# Patient Record
Sex: MALE | Race: WHITE
[De-identification: names, ages, dates, MRNs, and addresses within clinical notes are randomized per-mention and may not be internally consistent; named-entity substitution may affect disease eponyms.]

---

## 2017-03-19 ENCOUNTER — HOSPITAL ENCOUNTER (EMERGENCY)
Dept: HOSPITAL 75 - ER | Age: 72
Discharge: HOME | End: 2017-03-19
Payer: COMMERCIAL

## 2017-03-19 VITALS — HEIGHT: 71 IN | WEIGHT: 176 LBS | BODY MASS INDEX: 24.64 KG/M2

## 2017-03-19 VITALS — SYSTOLIC BLOOD PRESSURE: 137 MMHG | DIASTOLIC BLOOD PRESSURE: 81 MMHG

## 2017-03-19 DIAGNOSIS — M47.816: ICD-10-CM

## 2017-03-19 DIAGNOSIS — S49.91XA: Primary | ICD-10-CM
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
## 2017-03-19 DIAGNOSIS — R51: ICD-10-CM

## 2017-03-19 DIAGNOSIS — M54.5: ICD-10-CM

## 2017-03-19 DIAGNOSIS — M79.604: ICD-10-CM
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
## 2017-03-19 LAB
ALBUMIN SERPL-MCNC: 3.8 G/DL (ref 3.2–4.5)
ALT SERPL-CCNC: 15 U/L (ref 0–55)
ANION GAP SERPL CALC-SCNC: 10 MMOL/L (ref 5–14)
AST SERPL-CCNC: 21 U/L (ref 5–34)
BASOPHILS # BLD AUTO: 0 10^3/UL (ref 0–0.1)
BASOPHILS NFR BLD AUTO: 0 % (ref 0–10)
BILIRUB SERPL-MCNC: 0.5 MG/DL (ref 0.1–1)
BUN SERPL-MCNC: 13 MG/DL (ref 7–18)
BUN/CREAT SERPL: 16
CALCIUM SERPL-MCNC: 8.8 MG/DL (ref 8.5–10.1)
CHLORIDE SERPL-SCNC: 100 MMOL/L (ref 98–107)
CO2 SERPL-SCNC: 27 MMOL/L (ref 21–32)
CREAT SERPL-MCNC: 0.82 MG/DL (ref 0.6–1.3)
EOSINOPHIL # BLD AUTO: 0.1 10^3/UL (ref 0–0.3)
EOSINOPHIL NFR BLD AUTO: 0 % (ref 0–10)
ERYTHROCYTE [DISTWIDTH] IN BLOOD BY AUTOMATED COUNT: 12.4 % (ref 10–14.5)
GFR SERPLBLD BASED ON 1.73 SQ M-ARVRAT: > 60 ML/MIN
GLUCOSE SERPL-MCNC: 96 MG/DL (ref 70–105)
LYMPHOCYTES # BLD AUTO: 1.8 X 10^3 (ref 1–4)
LYMPHOCYTES NFR BLD AUTO: 15 % (ref 12–44)
MCH RBC QN AUTO: 35 PG (ref 25–34)
MCHC RBC AUTO-ENTMCNC: 35 G/DL (ref 32–36)
MCV RBC AUTO: 99 FL (ref 80–99)
MONOCYTES # BLD AUTO: 1 X 10^3 (ref 0–1)
MONOCYTES NFR BLD AUTO: 9 % (ref 0–12)
NEUTROPHILS # BLD AUTO: 8.6 X 10^3 (ref 1.8–7.8)
NEUTROPHILS NFR BLD AUTO: 75 % (ref 42–75)
PLATELET # BLD: 160 10^3/UL (ref 130–400)
PMV BLD AUTO: 9.4 FL (ref 7.4–10.4)
POTASSIUM SERPL-SCNC: 3.9 MMOL/L (ref 3.6–5)
PROT SERPL-MCNC: 6.4 G/DL (ref 6.4–8.2)
RBC # BLD AUTO: 3.96 10^6/UL (ref 4.35–5.85)
SODIUM SERPL-SCNC: 137 MMOL/L (ref 135–145)
WBC # BLD AUTO: 11.5 10^3/UL (ref 4.3–11)

## 2017-03-19 PROCEDURE — 72170 X-RAY EXAM OF PELVIS: CPT

## 2017-03-19 PROCEDURE — 36415 COLL VENOUS BLD VENIPUNCTURE: CPT

## 2017-03-19 PROCEDURE — 96372 THER/PROPH/DIAG INJ SC/IM: CPT

## 2017-03-19 PROCEDURE — 72100 X-RAY EXAM L-S SPINE 2/3 VWS: CPT

## 2017-03-19 PROCEDURE — 80053 COMPREHEN METABOLIC PANEL: CPT

## 2017-03-19 PROCEDURE — 85025 COMPLETE CBC W/AUTO DIFF WBC: CPT

## 2017-03-19 PROCEDURE — 99283 EMERGENCY DEPT VISIT LOW MDM: CPT
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
## 2017-03-19 NOTE — XMS REPORT
Continuity of Care Document

 Created on: 2017



Bear Maurer

External Reference #: [id number]

: 1945

Sex: Male



Demographics







 Address  [address] [place]  [address]

 

 Home Phone  [phone]

 

 Preferred Language  English

 

 Marital Status  

 

 Religion Affiliation  NON

 

 Race  White or 

 

 Ethnic Group  Not  or 





Author







 Author  [facility]

 

 Organization  [facility]

 

 Address  Unknown

 

 Phone  Unavailable







Support







 Name  Relationship  Address  Phone

 

 , Sera Maurer  [employer]  [address] [place]  [address]  [phone]







Care Team Providers







 Care Team Member Name  Role  Phone

 

 FrancisJef  PCP  [phone]







Source Comments

Some departments are not documenting in the electronic medical record.  If you 
do not see the information that you expected, contact Release of Information in 
the Health Information Management department at [phone] for further 
assistance in locating additional records.[facility]



Active Allergies and Adverse Reactions







    



  Allergen   Noted Date   Severity   Reactions   Comments

 

    



  Latex   03/10/2015   Medium   RASH 







Current Medications







      



  Prescription   Sig.   Disp.   Refills   Start   End Date   Status



      Date  

 

      



  selenium 200 mcg tab   Take 200 mcg by mouth       Active



  tablet   daily.     

 

      



  cholecalciferol (Vitamin   Take 1,000 Units by mouth       Active



  D3) (VITAMIN D-3) 1,000   daily.     



  units tablet      

 

      



  coenzyme Q10(+) 100 mg   Take 100 mg by mouth.       Active



  cap      

 

      



  Zinc 50 mg tab   Take 50 mg by mouth.       Active

 

      



  pyridoxine (VITAMIN B-6)   Take 100 mg by mouth       Active



  100 mg tablet   daily.     

 

      



  Chromium Picolinate 200   Take 200 mcg by mouth       Active



  mcg tab   daily.     

 

      



  KELP PO   Take  by mouth daily.       Active

 

      



  pomegranate fruit extract   Take  by mouth daily.       Active



  250 mg cap      

 

      



  aspirin EC 81 mg tablet   Take 81 mg by mouth       Active



   daily.     

 

      



  MAGNESIUM PO   Take  by mouth daily.       Active

 

      



  DOCOSAHEXANOIC ACID/EPA   Take 1,200 mg by mouth       Active



  (FISH OIL PO)   daily.     

 

      



  Lysine (L-LYSINE) 500 mg   Take 500 mg by mouth       Active



  tab   daily.     

 

      



  other medication   Take 1 Dose by mouth       Active



   daily. Mushroom Complex     

 

      



  folic acid 400 mcg tablet   Take 800 mcg by mouth       Active



   daily.     

 

      



  ALFALFA PO   Take 2 Tabs by mouth       Active



   daily.     

 

      



  docusate (COLACE) 100 mg   Take 200 mg by mouth       Active



  capsule   daily.     

 

      



  arginine 500 mg tablet   Take 1,000 mg by mouth       Active



   daily.     

 

      



  cyanocobalamin (VITAMIN   Take 3,000 mcg by mouth       Active



  B-12) 1,000 mcg tablet   daily.     

 

      



  ascorbic acid (VITAMIN-C)   Take 1,500 mg by mouth       Active



  500 mg tablet   daily.     

 

      



  GLUCOSAM HCL/CHONDRO ALCALA   Take 1 Tab by mouth four       Active



  A/C/MN   times daily. Glucosamine     



  (GLUCOSAMINE-CHONDROITIN   1500mg, chondroitin     



  COMPLX PO)   1200mg     

 

      



  POTASSIUM GLUCONATE PO   Take  by mouth five times       Active



   daily.     







Active Problems







 



  Problem   Noted Date

 

 



  Elevated PSA   03/10/2015













  Overview:



  25g prostate with nodularity



  2/3/15 PSA 6.43



  13 PSA 4.75



  10/8/12 PSA 3.93



  2010 PSA 2.51







Social History







    



  Tobacco Use   Types   Packs/Day   Years Used   Date

 

    



  Never Smoker    









   



  Smokeless Tobacco: Never   



  Used   









   



  Alcohol Use   Drinks/Week   oz/Week   Comments

 

   



  Yes   5 Cans of   3.0 



   beer  







Last Filed Vital Signs







  



  Vital Sign   Reading   Time Taken

 

  



  Blood Pressure   128/84   2016  1:02 PM CST

 

  



  Pulse   100   2016  1:02 PM CST

 

  



  Temperature   -   -

 

  



  Respiratory Rate   -   -

 

  



  Height   1.778 m (5' 10")   2016  1:02 PM CST

 

  



  Weight   80.922 kg (178 lb 6.4 oz)   2016  1:02 PM CST

 

  



  Body Mass Index   25.6   2016  1:02 PM CST

 

  



  Oxygen Saturation   -   -







Plan of Care







   



  Health Maintenance   Due Date   Last Done   Comments

 

   



  Physical (Comprehensive)   1952  



  Exam   

 

   



  Pertussis Vaccine   1958  

 

   



  Tetanus Vaccine   1964  

 

   



  Colorectal Cancer   1997  



  Screening   

 

   



  Shingles Vaccine   2007  

 

   



  Prevnar/Pneumovax (#1)   2012  

 

   



  Influenza Vaccine   2016  







Results from Last 3 Months

Not on file
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
## 2017-07-26 ENCOUNTER — HOSPITAL ENCOUNTER (OUTPATIENT)
Dept: HOSPITAL 75 - RAD | Age: 72
End: 2017-07-26
Attending: ORTHOPAEDIC SURGERY
Payer: MEDICARE

## 2017-07-26 DIAGNOSIS — R93.7: Primary | ICD-10-CM

## 2017-07-26 DIAGNOSIS — Z96.641: ICD-10-CM

## 2017-07-26 NOTE — DIAGNOSTIC IMAGING REPORT
AP pelvis and two views of the right hip.



INDICATION: History of right hip arthroplasty. Pelvic and right

hip pain.



FINDINGS: Right hip arthroplasty is seen in good position. The

right hip demonstrates mild degenerative changes. Mild

degenerative changes at the SI joints also seen. There are

multiple sclerotic lesions seen in the pelvis which may relate to

sclerotic metastasis. Surgical clips in the pelvis and radiopaque

markers of hernia mesh repair are suggested over the pelvic area

bilaterally.



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



Dictated by: 



  Dictated on workstation # [id number]

## 2023-07-10 ENCOUNTER — HOSPITAL ENCOUNTER (OUTPATIENT)
Dept: HOSPITAL 75 - RAD | Age: 78
End: 2023-07-10
Attending: FAMILY MEDICINE
Payer: MEDICARE

## 2023-07-10 DIAGNOSIS — N50.811: Primary | ICD-10-CM

## 2023-07-10 PROCEDURE — 76870 US EXAM SCROTUM: CPT

## 2023-07-10 NOTE — DIAGNOSTIC IMAGING REPORT
PROCEDURE: US Scrotum.



TECHNIQUE: Multiple real-time grayscale images were obtained over

the scrotum in various projections bilaterally.



INDICATION: Right scrotal/testicular pain.



COMPARISON: None.



FINDINGS: The testicles are normal in size, shape and

echogenicity. Right testis measures 2.7 x 1.3 x 2.2 cm. Left

testis measures 2.9 x 1.4 x 2.2 cm. There is normal color flow

Doppler signal of both testicles. No focal testicular mass is

seen on either side. The right and left epididymides are

unremarkable. There is no evidence of epididymitis. There is no

large hydrocele on either side



IMPRESSION: Normal testicular sonogram.



Dictated by: 



  Dictated on workstation # [id number]

## 2023-08-14 ENCOUNTER — HOSPITAL ENCOUNTER (OUTPATIENT)
Dept: HOSPITAL 75 - CARD | Age: 78
End: 2023-08-14
Attending: RADIOLOGY
Payer: MEDICARE

## 2023-08-14 DIAGNOSIS — C61: ICD-10-CM

## 2023-08-14 DIAGNOSIS — C79.51: ICD-10-CM

## 2023-08-14 DIAGNOSIS — I51.7: Primary | ICD-10-CM

## 2023-08-14 PROCEDURE — 93306 TTE W/DOPPLER COMPLETE: CPT
